# Patient Record
Sex: FEMALE | URBAN - METROPOLITAN AREA
[De-identification: names, ages, dates, MRNs, and addresses within clinical notes are randomized per-mention and may not be internally consistent; named-entity substitution may affect disease eponyms.]

---

## 2022-10-20 ENCOUNTER — OFFICE VISIT (OUTPATIENT)
Dept: URBAN - METROPOLITAN AREA CLINIC 31 | Facility: CLINIC | Age: 72
End: 2022-10-20
Payer: MEDICARE

## 2022-10-20 DIAGNOSIS — H25.13 AGE-RELATED NUCLEAR CATARACT, BILATERAL: ICD-10-CM

## 2022-10-20 DIAGNOSIS — H35.3134 NEXDTVE AGE-REL MCLR DEGN, BI, ADV ATRPC WITH SUBFOVEAL INVL: Primary | ICD-10-CM

## 2022-10-20 PROCEDURE — 99204 OFFICE O/P NEW MOD 45 MIN: CPT | Performed by: OPHTHALMOLOGY

## 2022-10-20 PROCEDURE — 92134 CPTRZ OPH DX IMG PST SGM RTA: CPT | Performed by: OPHTHALMOLOGY

## 2022-10-20 ASSESSMENT — INTRAOCULAR PRESSURE
OS: 21
OD: 21

## 2022-10-20 NOTE — IMPRESSION/PLAN
Impression: Nexdtve age-rel mclr degn, bi, adv atrpc with subfoveal invl: H35.8601. 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 The diagnosis, natural history, and prognosis of dry AMD with GA were discussed at length. The patient was instructed on the importance of taking AREDS2 vitamins and informed that smoking increases the risk of blindness for this disease. Also, discussed participation in clinical trial.  The patient was educated on the proper use of the Amsler grid and encouraged to use it daily to monitor the vision in each eye. The patient was instructed to call our office immediately upon any decreased vision or increased distortion. 

RTC 3 mo with OCT OU [phone]

## 2022-10-20 NOTE — IMPRESSION/PLAN
Impression: Age-related nuclear cataract, bilateral: H25.13. Plan: Patient notes a decline in vision. Exam demonstrates a cataract which may be visually significant. Discussed R/B/A of surgery vs observation. Recommend evaluation for surgery. Discussed that visual prognosis after cataract surgery is unclear given concurrent retinal disease, but the patient can still benefit from evaluation and possible surgery.

## 2023-01-19 ENCOUNTER — OFFICE VISIT (OUTPATIENT)
Dept: URBAN - METROPOLITAN AREA CLINIC 27 | Facility: CLINIC | Age: 73
End: 2023-01-19
Payer: MEDICARE

## 2023-01-19 DIAGNOSIS — H25.13 AGE-RELATED NUCLEAR CATARACT, BILATERAL: Primary | ICD-10-CM

## 2023-01-19 DIAGNOSIS — H35.3211 EXDTVE AGE-REL MCLR DEGN, RIGHT EYE, WITH ACTV CHRDL NEOVAS: ICD-10-CM

## 2023-01-19 DIAGNOSIS — H35.3124 NEXDTVE AGE-REL MCLR DEGN, L EYE, ADV ATRPC WITH SBFVL INVL: ICD-10-CM

## 2023-01-19 PROCEDURE — 67028 INJECTION EYE DRUG: CPT | Performed by: OPHTHALMOLOGY

## 2023-01-19 PROCEDURE — 99214 OFFICE O/P EST MOD 30 MIN: CPT | Performed by: OPHTHALMOLOGY

## 2023-01-19 PROCEDURE — 92134 CPTRZ OPH DX IMG PST SGM RTA: CPT | Performed by: OPHTHALMOLOGY

## 2023-01-19 ASSESSMENT — INTRAOCULAR PRESSURE
OD: 12
OS: 14

## 2023-01-19 NOTE — IMPRESSION/PLAN
Impression: Exdtve age-rel mclr degn, right eye, with actv chrdl neovas: H35.2847. Plan: Patient notes new distortion. Exam and OCT demonstrate new IRF. The findings are consistent with conversion to wet AMD.  The diagnosis, natural history, and prognosis of wet AMD were discussed. The R/B/As of various treatment options including observation, laser (PDT), and intravitreal Anti-VEGF injections were discussed. Participation in a clinical trial was also discussed. The patient understands that treatment may not improve vision, but should reduce the risk of further visual loss. The patient also understands the likely need for chronic treatment and the risk of vision loss without treatment. Recommend intravitreal Avastin injection today. R/B/A discussed and patient elects to proceed. Intravitreal Avastin injection was performed in the Right eye in clinic without complication.   

RTC 4 weeks re-eval with OCT OU, possible Avastin OD

## 2023-01-19 NOTE — IMPRESSION/PLAN
Impression: Nexdtve age-rel mclr degn, l eye, adv atrpc with sbfvl invl: H30.0580. Plan: Vision worse today. No evidence of fluid. Atrophy is subfoveal and may be cause of vision decline. Will follow closely.

## 2023-02-16 ENCOUNTER — OFFICE VISIT (OUTPATIENT)
Dept: URBAN - METROPOLITAN AREA CLINIC 27 | Facility: CLINIC | Age: 73
End: 2023-02-16
Payer: MEDICARE

## 2023-02-16 DIAGNOSIS — H35.3124 NEXDTVE AGE-REL MCLR DEGN, L EYE, ADV ATRPC WITH SBFVL INVL: ICD-10-CM

## 2023-02-16 DIAGNOSIS — H35.3211 EXDTVE AGE-REL MCLR DEGN, RIGHT EYE, WITH ACTV CHRDL NEOVAS: Primary | ICD-10-CM

## 2023-02-16 DIAGNOSIS — H25.13 AGE-RELATED NUCLEAR CATARACT, BILATERAL: ICD-10-CM

## 2023-02-16 PROCEDURE — 67028 INJECTION EYE DRUG: CPT | Performed by: OPHTHALMOLOGY

## 2023-02-16 PROCEDURE — 99214 OFFICE O/P EST MOD 30 MIN: CPT | Performed by: OPHTHALMOLOGY

## 2023-02-16 PROCEDURE — 92134 CPTRZ OPH DX IMG PST SGM RTA: CPT | Performed by: OPHTHALMOLOGY

## 2023-02-16 ASSESSMENT — INTRAOCULAR PRESSURE
OD: 17
OS: 20

## 2023-03-22 ENCOUNTER — OFFICE VISIT (OUTPATIENT)
Dept: URBAN - METROPOLITAN AREA CLINIC 41 | Facility: CLINIC | Age: 73
End: 2023-03-22

## 2023-03-22 DIAGNOSIS — H35.3211 EXDTVE AGE-REL MCLR DEGN, RIGHT EYE, WITH ACTV CHRDL NEOVAS: Primary | ICD-10-CM

## 2023-03-22 DIAGNOSIS — Z96.1 PRESENCE OF INTRAOCULAR LENS: ICD-10-CM

## 2023-03-22 DIAGNOSIS — H35.3124 NEXDTVE AGE-REL MCLR DEGN, L EYE, ADV ATRPC WITH SBFVL INVL: ICD-10-CM

## 2023-03-22 PROCEDURE — 67028 INJECTION EYE DRUG: CPT | Performed by: OPHTHALMOLOGY

## 2023-03-22 PROCEDURE — 99214 OFFICE O/P EST MOD 30 MIN: CPT | Performed by: OPHTHALMOLOGY

## 2023-03-22 PROCEDURE — 92134 CPTRZ OPH DX IMG PST SGM RTA: CPT | Performed by: OPHTHALMOLOGY

## 2023-03-22 ASSESSMENT — INTRAOCULAR PRESSURE
OS: 12
OD: 12

## 2023-03-22 NOTE — IMPRESSION/PLAN
Impression: Nexdtve age-rel mclr degn, l eye, adv atrpc with sbfvl invl: H37.5934. Plan: Vision worse today. No evidence of fluid. Atrophy is subfoveal and is cause of poor vision. Will follow closely.

## 2023-03-22 NOTE — IMPRESSION/PLAN
Impression: Presence of intraocular lens: Z96.1. Plan: Vision improved after cataract surgery with Dr. Pérez Bates!

## 2023-03-22 NOTE — IMPRESSION/PLAN
Impression: Exdtve age-rel mclr degn, right eye, with actv chrdl neovas: H35.3211.
-s/p Avastin last 02/16/2023 Plan: Exam and OCT demonstrate improved cysts/IRF after starting anti-VEGF tx; fortunately, no significant change extending from 4 wks to 6 wks. Discussed that it is still not entirely clear if the cysts on OCT are related to IRF vs degenerative cysts. Recommend intravitreal Avastin today and maintain 6 wk follow up. She will be moving to [place]. R/B/A discussed and patient elects to proceed. Intravitreal Avastin injection was administered today without complication.   

RTC 6 wks OCT OU; re-eval with possible AVASTIN (Will be at [facility] in [place] in June)

## 2023-05-05 ENCOUNTER — OFFICE VISIT (OUTPATIENT)
Dept: URBAN - METROPOLITAN AREA CLINIC 41 | Facility: CLINIC | Age: 73
End: 2023-05-05
Payer: MEDICARE

## 2023-05-05 DIAGNOSIS — H35.3211 EXDTVE AGE-REL MCLR DEGN, RIGHT EYE, WITH ACTV CHRDL NEOVAS: Primary | ICD-10-CM

## 2023-05-05 DIAGNOSIS — H35.3124 NEXDTVE AGE-REL MCLR DEGN, L EYE, ADV ATRPC WITH SBFVL INVL: ICD-10-CM

## 2023-05-05 DIAGNOSIS — Z96.1 PRESENCE OF INTRAOCULAR LENS: ICD-10-CM

## 2023-05-05 PROCEDURE — 92012 INTRM OPH EXAM EST PATIENT: CPT | Performed by: OPHTHALMOLOGY

## 2023-05-05 PROCEDURE — 92134 CPTRZ OPH DX IMG PST SGM RTA: CPT | Performed by: OPHTHALMOLOGY

## 2023-05-05 PROCEDURE — 67028 INJECTION EYE DRUG: CPT | Performed by: OPHTHALMOLOGY

## 2023-05-05 ASSESSMENT — INTRAOCULAR PRESSURE
OD: 14
OS: 17

## 2023-05-05 NOTE — IMPRESSION/PLAN
Impression: Nexdtve age-rel mclr degn, l eye, adv atrpc with sbfvl invl: H36.7043. Plan: Vision worse today. No evidence of fluid. Atrophy is subfoveal and is cause of poor vision. Will follow closely.

## 2023-05-05 NOTE — IMPRESSION/PLAN
Impression: Presence of intraocular lens: Z96.1. Plan: Vision improved after cataract surgery with Dr. Burke Delong! + PCO OS.  Rec eval for YAG OS

## 2023-05-05 NOTE — IMPRESSION/PLAN
Impression: Exdtve age-rel mclr degn, right eye, with actv chrdl neovas: H35.3211.
-s/p Avastin last 03/22/2023 Plan: Exam and OCT demonstrate improved cysts/IRF after starting anti-VEGF tx; fortunately, no significant change extending from 4 wks to 6 wks. Discussed that it is still not entirely clear if the cysts on OCT are related to exudative vs degenerative cysts. Recommend intravitreal Avastin today and maintain 6 wk follow up. She will be moving to [place]. R/B/A discussed and patient elects to proceed. Intravitreal Avastin injection was administered today without complication.   

RTC 4-6 wks OCT OU; re-eval with possible AVASTIN (Will be moving to [facility] in [place] in June)

## 2023-06-05 ENCOUNTER — OFFICE VISIT (OUTPATIENT)
Dept: URBAN - METROPOLITAN AREA CLINIC 41 | Facility: CLINIC | Age: 73
End: 2023-06-05
Payer: MEDICARE

## 2023-06-05 DIAGNOSIS — H35.3124 NEXDTVE AGE-REL MCLR DEGN, L EYE, ADV ATRPC WITH SBFVL INVL: ICD-10-CM

## 2023-06-05 DIAGNOSIS — H35.3211 EXDTVE AGE-REL MCLR DEGN, RIGHT EYE, WITH ACTV CHRDL NEOVAS: Primary | ICD-10-CM

## 2023-06-05 DIAGNOSIS — Z96.1 PRESENCE OF INTRAOCULAR LENS: ICD-10-CM

## 2023-06-05 PROCEDURE — 67028 INJECTION EYE DRUG: CPT | Performed by: OPHTHALMOLOGY

## 2023-06-05 PROCEDURE — 99214 OFFICE O/P EST MOD 30 MIN: CPT | Performed by: OPHTHALMOLOGY

## 2023-06-05 PROCEDURE — 92134 CPTRZ OPH DX IMG PST SGM RTA: CPT | Performed by: OPHTHALMOLOGY

## 2023-06-05 ASSESSMENT — INTRAOCULAR PRESSURE
OD: 11
OS: 16

## 2023-06-05 NOTE — IMPRESSION/PLAN
Impression: Exdtve age-rel mclr degn, right eye, with actv chrdl neovas: H35.3211.
-s/p Avastin last 05/05/2023 Plan: Exam and OCT demonstrate stable cysts/IRF after starting anti-VEGF tx; in past, no significant change extending from 4 wks to 6 wks. There is no change today tapering from 6 wks to 4 wks. Discussed that it is still not entirely clear if the cysts on OCT are related to exudative vs degenerative cysts. Recommend intravitreal Avastin today and maintain 6 wk follow up. She will be moving to [place]. R/B/A discussed and patient elects to proceed. Intravitreal Avastin injection was administered today without complication.   

RTC 4-6 wks OCT OU; re-eval with possible AVASTIN (Will be moving to [facility] in [place] in June)

## 2023-06-05 NOTE — IMPRESSION/PLAN
Impression: Nexdtve age-rel mclr degn, l eye, adv atrpc with sbfvl invl: H38.0625. Plan: Vision improved today with updated MRx. No evidence of fluid. Atrophy is subfoveal and is cause of poor vision; unclear if PCO is visually significant given underlying retinal disease. AG testing discussed.

## 2023-06-05 NOTE — IMPRESSION/PLAN
Impression: Presence of intraocular lens: Z96.1. Plan: Vision improved after cataract surgery with Dr. Rama Fletcher! + PCO OS. Rec eval for YAG OS.   She will hold off on this eval until after she moves to [place]